# Patient Record
Sex: MALE | Race: BLACK OR AFRICAN AMERICAN | NOT HISPANIC OR LATINO
[De-identification: names, ages, dates, MRNs, and addresses within clinical notes are randomized per-mention and may not be internally consistent; named-entity substitution may affect disease eponyms.]

---

## 2017-07-27 ENCOUNTER — APPOINTMENT (OUTPATIENT)
Dept: CARDIOLOGY | Facility: CLINIC | Age: 62
End: 2017-07-27

## 2017-08-10 ENCOUNTER — APPOINTMENT (OUTPATIENT)
Dept: CARDIOLOGY | Facility: CLINIC | Age: 62
End: 2017-08-10
Payer: COMMERCIAL

## 2017-08-10 PROCEDURE — 93306 TTE W/DOPPLER COMPLETE: CPT

## 2021-05-14 ENCOUNTER — APPOINTMENT (OUTPATIENT)
Dept: CARDIOLOGY | Facility: CLINIC | Age: 66
End: 2021-05-14
Payer: COMMERCIAL

## 2021-05-14 VITALS — TEMPERATURE: 99 F | WEIGHT: 202 LBS | DIASTOLIC BLOOD PRESSURE: 84 MMHG | SYSTOLIC BLOOD PRESSURE: 139 MMHG

## 2021-05-14 DIAGNOSIS — Z86.39 PERSONAL HISTORY OF OTHER ENDOCRINE, NUTRITIONAL AND METABOLIC DISEASE: ICD-10-CM

## 2021-05-14 DIAGNOSIS — Z78.9 OTHER SPECIFIED HEALTH STATUS: ICD-10-CM

## 2021-05-14 DIAGNOSIS — R06.00 DYSPNEA, UNSPECIFIED: ICD-10-CM

## 2021-05-14 PROCEDURE — 99204 OFFICE O/P NEW MOD 45 MIN: CPT

## 2021-05-14 PROCEDURE — 93000 ELECTROCARDIOGRAM COMPLETE: CPT

## 2021-05-14 NOTE — DISCUSSION/SUMMARY
[FreeTextEntry1] : Mr. Vo is a 65 year old male with DM2, here for evaluation. He feels well overall and has no premature family history of CAD. His recent echocardiogram did demonstrate normal LV function and borderline LVH. His BP is borderline.\par In the setting of his mild dyspnea and Dm2, he will be set up for a nuclear stress test in our office. I am also interested in seeing his BP response to exercise. He has stopped taking his medications, and we will need to see what his blood work looks like today.\par I have stressed the importance of diet and exercise to reduce his overall CV risk.\par We will speak after the above test and arrange follow up.

## 2021-05-14 NOTE — HISTORY OF PRESENT ILLNESS
[FreeTextEntry1] : Timur is a 65 year old male here for evaluation.\par He has a history of diabetes though no longer takes medications, and uses only herbal supplements. No recent blood work is available, and labs are to be drawn today.\par \par He feels well overall. He has slowed down a bit, though reports being able to play soccer. He has no chest pain, difficulty breathing or palpitations. \par He denies a family history of CAD. He denies toxic habits. \par Carotids in 2020 with mild plaque bilaterally.\par Echocardiogram in 2020 with normal LV function, mild MR and TR, and borderline LVH.

## 2021-06-14 ENCOUNTER — APPOINTMENT (OUTPATIENT)
Dept: CARDIOLOGY | Facility: CLINIC | Age: 66
End: 2021-06-14

## 2024-03-29 ENCOUNTER — EMERGENCY (EMERGENCY)
Facility: HOSPITAL | Age: 69
LOS: 1 days | Discharge: HOME | End: 2024-03-29
Admitting: EMERGENCY MEDICINE
Payer: OTHER MISCELLANEOUS

## 2024-03-29 VITALS
DIASTOLIC BLOOD PRESSURE: 91 MMHG | RESPIRATION RATE: 18 BRPM | SYSTOLIC BLOOD PRESSURE: 162 MMHG | HEART RATE: 61 BPM | TEMPERATURE: 98 F | OXYGEN SATURATION: 99 %

## 2024-03-29 PROCEDURE — 73502 X-RAY EXAM HIP UNI 2-3 VIEWS: CPT | Mod: 26,RT

## 2024-03-29 PROCEDURE — 99284 EMERGENCY DEPT VISIT MOD MDM: CPT

## 2024-03-29 NOTE — ED PROVIDER NOTE - PATIENT PORTAL LINK FT
You can access the FollowMyHealth Patient Portal offered by [facility] by registering at the following website: [email or web address]. By joining [employer]’s FollowMyHealth portal, you will also be able to view your health information using other applications (apps) compatible with our system.

## 2024-03-29 NOTE — ED PROVIDER NOTE - CLINICAL SUMMARY MEDICAL DECISION MAKING FREE TEXT BOX
67yo M with PMH DM, Arthritis presenting with atraumatic right hip pain several day after playing soccer. Ambulatory with limp. NO swelling or discoloration.     Concerning for arthritic pain vs hip strain 2/2 to playing soccer   Xray area to assess for bony abnormalities  patient declined pain control at this time  reassess 69yo M with PMH DM, Arthritis presenting with atraumatic right hip pain several day after playing soccer. Ambulatory with limp. NO swelling or discoloration.     Concerning for arthritic pain vs hip strain 2/2 to playing soccer   Xray area to assess for bony abnormalities  patient declined pain control at this time  anticipate dispo with ortho follow up if xray negative

## 2024-03-29 NOTE — ED ADULT NURSE NOTE - NSFALLUNIVINTERV_ED_ALL_ED
Bed/Stretcher in lowest position, wheels locked, appropriate side rails in place/Call bell, personal items and telephone in reach/Instruct patient to call for assistance before getting out of bed/chair/stretcher/Non-slip footwear applied when patient is off stretcher/[place] to call system/Physically safe environment - no spills, clutter or unnecessary equipment/Purposeful proactive rounding/Room/bathroom lighting operational, light cord in reach

## 2024-03-29 NOTE — ED ADULT NURSE NOTE - OBJECTIVE STATEMENT
Patient received intake a&ox4, ambulatory. c/o right groin pain radiating to hip and lower back, worse when walking x3 weeks. Pt denies injury or trauma, chest pain, sob, numbness, tingling. Breathing even, unlabored. Pending xray. Safety maintained.

## 2024-03-29 NOTE — ED PROVIDER NOTE - PHYSICAL EXAMINATION
Right hip: no swelling or discoloration, no tenderness to palpation; ROM intact; no pain with internal or external rotation of hip    No palpable hernia in right groin

## 2024-03-29 NOTE — ED ADULT TRIAGE NOTE - CHIEF COMPLAINT QUOTE
Pt c/o right groin pain radiating to hip and lower back, worse when walking x3 weeks. Denies urinary symptoms. Denies hx

## 2024-03-29 NOTE — ED PROVIDER NOTE - OBJECTIVE STATEMENT
67yo M with PMH DM, Arthritis presenting with right hip pain for the past 3 weeks. Pain radiating to right groin. Patient started several days after playing soccer. No fall or trauma to the area. Plays soccer just about every weekend. Pain worse with movements. Ambulatory with limp. Has not attempted pain control. No bruising or discoloration. No numbness or tingling. Otherwise no other acute concerns.

## 2024-03-29 NOTE — ED PROVIDER NOTE - NSFOLLOWUPINSTRUCTIONS_ED_ALL_ED_FT
Follow up with Orthopedics within 1 week  Tylenol or Ibuprofen (unless contraindicated) as needed for pain  avoid aggravating factors   any worsening in symptoms or acute concerns seek immediate care

## 2024-03-29 NOTE — ED PROVIDER NOTE - NSICDXNOFAMILYHX_GEN_ALL_ED
"Med rec updated and complete  Allergies reviewed  Pt states \"I took all my medication last night\".  Pt states \"No antibiotics in the last 30 days, that she had to take at home\".  Pt states \"No vitamins or OTC'S\".    " <-- Click to add NO pertinent Family History

## 2024-04-02 ENCOUNTER — APPOINTMENT (OUTPATIENT)
Age: 69
End: 2024-04-02
Payer: COMMERCIAL

## 2024-04-02 VITALS
DIASTOLIC BLOOD PRESSURE: 82 MMHG | HEIGHT: 75 IN | HEART RATE: 87 BPM | WEIGHT: 210 LBS | SYSTOLIC BLOOD PRESSURE: 130 MMHG | BODY MASS INDEX: 26.11 KG/M2 | OXYGEN SATURATION: 97 %

## 2024-04-02 DIAGNOSIS — M25.851 OTHER SPECIFIED JOINT DISORDERS, RIGHT HIP: ICD-10-CM

## 2024-04-02 PROBLEM — E11.9 TYPE 2 DIABETES MELLITUS WITHOUT COMPLICATIONS: Chronic | Status: ACTIVE | Noted: 2024-03-29

## 2024-04-02 PROCEDURE — 99204 OFFICE O/P NEW MOD 45 MIN: CPT

## 2024-04-03 NOTE — PHYSICAL EXAM
[FreeTextEntry1] : PE: Constitutional: In NAD, calm and cooperative Cardio: Extremities appear well perfused, no peripheral edema Respiratory: Normal respiratory effort on room air, no accessory muscle use Psych: Normal affect, intact judgment and insight Neuro: Awake, alert and oriented x 3, see below for focused neurological exam MSK (Back/Hip)                  Inspection: no gross swelling identified                 Palpation: Nontender to palpation over lumbar paraspinals, midline lumbar spine, SIJ, greater trochanters, ASIS/AIIS.                 ROM: Pain with lumbar extension, unable to tolerate facet loading due to pain with mild extension. Significantly decreased ROM of R hip on internal rotation, with slight decrease in ROM on ER.                  Strength: 5/5 strength in bilateral lower extremities                 Reflexes: bilateral patellar and achilles reflexes diminished, symmetrically                 Sensation: Intact to light touch in bilateral lower extremities  Special tests: SLR: Negative MITRA: Negative FADIR: Negative Stinchfield: Positive Log roll: Positive

## 2024-04-03 NOTE — PROCEDURE
[de-identified] : XR of right hip Date: 3/29/2024   Views: 3 views Performed at [facility]: Yes Impression: No joint space narrowing of the intra-articular femoral acetabular joint.  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  Moderate osteophytic formation and bridging of the L4-L5 lumbar vertebrae.  These images were personally reviewed with original findings documented as above.

## 2024-04-03 NOTE — HISTORY OF PRESENT ILLNESS
[FreeTextEntry1] : Timur Vo is a 69 y/o M presenting for acute on chronic R low back/hip pain. He describes the pain in the posterolateral hip region with radiation towards the groin. He notes some low back pain as well. The pain is sharp, and worse with standing from seated position, lying down, ambulating. The pain started 1 month ago after lifting a box <50lbs, and he notes some improvement after that however exacerbated while playing soccer. He recently went to ED/urgent care who performed bilateral hip imaging and recommended Motrin. He notes some improvement in pain with Motrin. He denied sudden weakness, bowel/bladder incontinence, fevers/sweats/chills/unexpected weight loss.

## 2024-04-03 NOTE — HISTORY OF PRESENT ILLNESS
[FreeTextEntry1] : Timur Vo is a 67 y/o M presenting for acute on chronic R low back/hip pain. He describes the pain in the posterolateral hip region with radiation towards the groin. He notes some low back pain as well. The pain is sharp, and worse with standing from seated position, lying down, ambulating. The pain started 1 month ago after lifting a box <50lbs, and he notes some improvement after that however exacerbated while playing soccer. He recently went to ED/urgent care who performed bilateral hip imaging and recommended Motrin. He notes some improvement in pain with Motrin. He denied sudden weakness, bowel/bladder incontinence, fevers/sweats/chills/unexpected weight loss.

## 2024-04-03 NOTE — ASSESSMENT
[FreeTextEntry1] : Timur's exam and history are consistent with a combination of acute on chronic lumbar spondylosis related pain and [place] of the R hip. Recommendations:  1) Continue Motrin as prescribed 2) Start physical therapy  3) Follow-up in 4-6 weeks to monitor for improvement of symptoms

## 2024-04-03 NOTE — PROCEDURE
[de-identified] : XR of right hip Date: 3/29/2024   Views: 3 views Performed at [facility]: Yes Impression: No joint space narrowing of the intra-articular femoral acetabular joint.  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  Moderate osteophytic formation and bridging of the L4-L5 lumbar vertebrae.  These images were personally reviewed with original findings documented as above.

## 2024-05-02 ENCOUNTER — APPOINTMENT (OUTPATIENT)
Age: 69
End: 2024-05-02
Payer: COMMERCIAL

## 2024-05-02 VITALS
HEART RATE: 85 BPM | BODY MASS INDEX: 26.11 KG/M2 | HEIGHT: 75 IN | WEIGHT: 210 LBS | DIASTOLIC BLOOD PRESSURE: 79 MMHG | OXYGEN SATURATION: 98 % | SYSTOLIC BLOOD PRESSURE: 133 MMHG

## 2024-05-02 DIAGNOSIS — M47.816 SPONDYLOSIS W/OUT MYELOPATHY OR RADICULOPATHY, LUMBAR REGION: ICD-10-CM

## 2024-05-02 PROCEDURE — 99213 OFFICE O/P EST LOW 20 MIN: CPT

## 2024-05-02 NOTE — HISTORY OF PRESENT ILLNESS
[de-identified] : Timur Vo is a 67 y/o M presenting for acute on chronic R low back/hip pain. He describes the pain in the posterolateral hip region with radiation towards the groin. He notes some low back pain as well. The pain is sharp, and worse with standing from seated position, lying down, ambulating. The pain started 1 month ago after lifting a box <50lbs, and he notes some improvement after that however exacerbated while playing soccer. He recently went to ED/urgent care who performed bilateral hip imaging and recommended Motrin. He notes some improvement in pain with Motrin. He denied sudden weakness, bowel/bladder incontinence, fevers/sweats/chills/unexpected weight loss.  Interval history: Patient states he still having pain radiating from the right side low back into the hip and knee.  He states he saw another doctor who prescribed celecoxib and gabapentin which have been helpful for his pain.  He has started physical therapy which she believes is helpful.  Here today for further evaluation.

## 2024-05-02 NOTE — DISCUSSION/SUMMARY
[de-identified] : Timur's exam and history are consistent with a combination of acute on chronic lumbar spondylosis related pain and does not appear to be related to intra-articular hip pathology at this time. Recommendations:  1) Continue celecoxib 200 mg twice a day as needed as well as gabapentin 300 mg as needed prescribed by his other physician 2) continue physical therapy 3) information given for pain management, he will follow-up with them as needed if not improving with conservative treatments for consultation.  He will follow-up with myself as needed.

## 2024-05-02 NOTE — PHYSICAL EXAM
[de-identified] : PE: Constitutional: In NAD, calm and cooperative Cardio: Extremities appear well perfused, no peripheral edema Respiratory: Normal respiratory effort on room air, no accessory muscle use Psych: Normal affect, intact judgment and insight Neuro: Awake, alert and oriented x 3, see below for focused neurological exam MSK (Back/Hip)   Inspection: no gross swelling identified  Palpation: Nontender to palpation over lumbar paraspinals, midline lumbar spine, SIJ, greater trochanters, ASIS/AIIS.  ROM: Pain with lumbar extension, unable to tolerate facet loading due to pain with mild extension. Significantly decreased ROM of R hip on internal rotation, with slight decrease in ROM on ER.  Strength: 5/5 strength in bilateral lower extremities  Reflexes: bilateral patellar and achilles reflexes diminished, symmetrically  Sensation: Intact to light touch in bilateral lower extremities  Special tests: SLR: Negative MITRA: Negative FADIR: Negative Stinchfield: Negative Log roll: Negative. [de-identified] : XR of right hip Date: 3/29/2024  Views: 3 views Performed at [facility]: Yes Impression: No joint space narrowing of the intra-articular femoral acetabular joint. 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 Moderate osteophytic formation and bridging of the L4-L5 lumbar vertebrae.  These images were personally reviewed with original findings documented as above.

## 2025-04-26 NOTE — ED ADULT NURSE NOTE - ALCOHOL PRE SCREEN (AUDIT - C)
Pt SDM, wife, Hali Eisenberg provided with dc education along with their daughter. All questions answered and feedback provided. Additional resources provided on chest pain and nitroglycerin prescriptions. All belongings packed and sent with pt. Pt to dc home with wife and daughter. Pt is not experiencing any chest pain and have been seen by hospitality and cardiology.    Statement Selected